# Patient Record
Sex: MALE | Race: OTHER | Employment: FULL TIME | URBAN - METROPOLITAN AREA
[De-identification: names, ages, dates, MRNs, and addresses within clinical notes are randomized per-mention and may not be internally consistent; named-entity substitution may affect disease eponyms.]

---

## 2022-05-13 ENCOUNTER — OFFICE VISIT (OUTPATIENT)
Dept: ORTHOPEDIC SURGERY | Age: 52
End: 2022-05-13
Payer: COMMERCIAL

## 2022-05-13 VITALS — HEIGHT: 78 IN | WEIGHT: 315 LBS | BODY MASS INDEX: 36.45 KG/M2

## 2022-05-13 DIAGNOSIS — M25.552 LEFT HIP PAIN: ICD-10-CM

## 2022-05-13 DIAGNOSIS — M16.12 PRIMARY OSTEOARTHRITIS OF LEFT HIP: Primary | ICD-10-CM

## 2022-05-13 PROCEDURE — 99204 OFFICE O/P NEW MOD 45 MIN: CPT | Performed by: PHYSICIAN ASSISTANT

## 2022-05-13 RX ORDER — DICLOFENAC SODIUM 75 MG/1
75 TABLET, DELAYED RELEASE ORAL 2 TIMES DAILY
Qty: 60 TABLET | Refills: 0 | Status: SHIPPED | OUTPATIENT
Start: 2022-05-13

## 2022-05-13 NOTE — PROGRESS NOTES
Krista Sharma (: 1970) is a 49 y.o. male, patient, here for evaluation of the following chief complaint(s):  Hip Pain (left groin)       SUBJECTIVE/OBJECTIVE:  Krista Sharma presents today complaining of new left hip pain. Pain started about a year ago with no known injury or trauma. Pain is intermittent, worse on some days than others. Difficulty with stair climbing and putting on socks and shoes. He has had to modify his activity to do so since. For the most part, hip does not limit his activity. He is able to work out on the elliptical and stationary bike, and  basketball. Does not take any medication for this. PHYSICAL EXAM:  Vitals: Ht 6' 10\" (2.083 m)   Wt (!) 365 lb (165.6 kg)   BMI 38.17 kg/m²   Body mass index is 38.17 kg/m². 49y.o. year old M in no acute distress. Ambulates with a normal gait pattern, no limp or Trendelenburg gait sign. No pain with lumbar extension or flexion. Positive Stinchfield on the left with painful passive range of motion, flexion to 90. Negative straight leg raise. Motor 5/5. No distal edema. 2+ dorsalis pedal pulse left lower extremity. IMAGING:  Radiographs: XR Results (most recent):  Results from Appointment encounter on 22    XR HIP LT W OR WO PELV 2-3 VWS    Narrative  3 views (standing AP pelvis, true AP and frog leg lateral) of the left hip were taken and reviewed today using digital radiography which reveal complete loss of lateral joint space, significant underlying CAM deformity, circumferential osteophyte formation. ASSESSMENT/PLAN:  1. Primary osteoarthritis of left hip  -     diclofenac EC (VOLTAREN) 75 mg EC tablet; Take 1 Tablet by mouth two (2) times a day., Normal, Disp-60 Tablet, R-0  2. Left hip pain  -     XR HIP LT W OR WO PELV 2-3 VWS; Future    The xray and exam findings were discussed with the patient today. Severe left hip osteoarthritis.   Discussed risks/benefits of conservative vs. operative interventions at this time to include NSAIDs, PT, cortisone injections, and surgery. He elects to start with prescription strength anti-inflammatories for the bad days. I encouraged him to remain active with low impact activity as tolerated. If pain worsens, our next step would be an intra-articular cortisone injection. He understands that after any injection, we cannot do a total hip for at least 3 months. Discussed natural disease progression and likely need for surgery in the future. 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 Return if symptoms worsen or fail to improve. Review Of Systems     Patient denies any recent fever, chills, nausea, vomiting, chest pain, or shortness of breath. Allergies   Allergen Reactions    Vicodin [Hydrocodone-Acetaminophen] Other (comments)       Current Outpatient Medications   Medication Sig    diclofenac EC (VOLTAREN) 75 mg EC tablet Take 1 Tablet by mouth two (2) times a day. No current facility-administered medications for this visit. History reviewed. No pertinent past medical history.      Past Surgical History:   Procedure Laterality Date    HAND/FINGER SURGERY UNLISTED         Family History   Problem Relation Age of Onset    OSTEOARTHRITIS Mother         Social History     Socioeconomic History    Marital status:      Spouse name: Not on file    Number of children: Not on file    Years of education: Not on file    Highest education level: Not on file   Occupational History    Not on file   Tobacco Use    Smoking status: Never Smoker    Smokeless tobacco: Never Used   Substance and Sexual Activity    Alcohol use: Yes     Comment: \"occasionally\"    Drug use: Not on file    Sexual activity: Not on file   Other Topics Concern    Not on file   Social History Narrative    Not on file     Social Determinants of Health     Financial Resource Strain:     Difficulty of Paying Living Expenses: Not on file   Food Insecurity:     Worried About [facility] in the Last Year: Not on file    Lang of Food in the Last Year: Not on file   Transportation Needs:     Lack of Transportation (Medical): Not on file    Lack of Transportation (Non-Medical): Not on file   Physical Activity:     Days of Exercise per Week: Not on file    Minutes of Exercise per Session: Not on file   Stress:     Feeling of Stress : Not on file   Social Connections:     Frequency of Communication with Friends and Family: Not on file    Frequency of Social Gatherings with Friends and Family: Not on file    Attends Nondenominational Services: Not on file    Active Member of [address] [place] [address] or Organizations: Not on file    Attends Club or Organization Meetings: Not on file    Marital Status: Not on file   Intimate Partner Violence:     Fear of Current or Ex-Partner: Not on file    Emotionally Abused: Not on file    Physically Abused: Not on file    Sexually Abused: Not on file   Housing Stability:     Unable to Pay for Housing in the Last Year: Not on file    Number of [place] in the Last Year: Not on file    Unstable Housing in the Last Year: Not on file       Orders Placed This Encounter    XR HIP LT [facility]. 2-3 VWS     Standing Status:   Future     Number of Occurrences:   1     Standing Expiration Date:   5/14/2023    diclofenac EC (VOLTAREN) 75 mg EC tablet     Sig: Take 1 Tablet by mouth two (2) times a day. Dispense:  60 Tablet     Refill:  0      Julián German M.D. was available for immediate consultation as the supervising physician. An electronic signature was used to authenticate this note.   -- Melissa Kiran PA-C

## 2022-05-13 NOTE — LETTER
5/13/2022    Patient: Tiffany Carranza   YOB: 1970   Date of Visit: 5/13/2022     Evgeny Mccormick, [address] [address]  Via Fax: [phone]    Dear Evgeny Mccormick MD,      Thank you for referring Mr. Gamal Katz to [facility] for evaluation. My notes for this consultation are attached. If you have questions, please do not hesitate to call me. I look forward to following your patient along with you.       Sincerely,    Yara Eldridge PA-C